# Patient Record
Sex: FEMALE | Race: WHITE
[De-identification: names, ages, dates, MRNs, and addresses within clinical notes are randomized per-mention and may not be internally consistent; named-entity substitution may affect disease eponyms.]

---

## 2021-08-19 ENCOUNTER — HOSPITAL ENCOUNTER (INPATIENT)
Dept: HOSPITAL 56 - MW.OBCHECK | Age: 26
LOS: 5 days | Discharge: HOME | DRG: 540 | End: 2021-08-24
Attending: OBSTETRICS & GYNECOLOGY | Admitting: OBSTETRICS & GYNECOLOGY
Payer: COMMERCIAL

## 2021-08-19 DIAGNOSIS — Z20.822: ICD-10-CM

## 2021-08-19 DIAGNOSIS — D64.9: ICD-10-CM

## 2021-08-19 DIAGNOSIS — Z90.49: ICD-10-CM

## 2021-08-19 DIAGNOSIS — Z3A.38: ICD-10-CM

## 2021-08-19 DIAGNOSIS — E61.1: ICD-10-CM

## 2021-08-19 DIAGNOSIS — Z88.1: ICD-10-CM

## 2021-08-19 LAB
BUN SERPL-MCNC: 8 MG/DL (ref 7–18)
CHLORIDE SERPL-SCNC: 103 MMOL/L (ref 98–107)
CO2 SERPL-SCNC: 23.1 MMOL/L (ref 21–32)
GLUCOSE SERPL-MCNC: 67 MG/DL (ref 74–106)
POTASSIUM SERPL-SCNC: 4.2 MMOL/L (ref 3.5–5.1)
SODIUM SERPL-SCNC: 137 MMOL/L (ref 136–145)

## 2021-08-19 PROCEDURE — U0002 COVID-19 LAB TEST NON-CDC: HCPCS

## 2021-08-20 RX ADMIN — ONDANSETRON PRN MG: 2 INJECTION, SOLUTION INTRAMUSCULAR; INTRAVENOUS at 16:37

## 2021-08-20 RX ADMIN — ONDANSETRON PRN MG: 2 INJECTION, SOLUTION INTRAMUSCULAR; INTRAVENOUS at 22:51

## 2021-08-20 NOTE — PCM.PREANE
Preanesthetic Assessment





- Anesthesia/Transfusion/Family Hx


Anesthesia History: Prior Anesthesia Without Reaction


Family History of Anesthesia Reaction: No


Transfusion History: No Prior Transfusion(s)





- Review of Systems


General: No Symptoms


Pulmonary: No Symptoms


Cardiovascular: No Symptoms


Gastrointestinal: No Symptoms


Neurological: No Symptoms


Other: Reports: None





- Physical Assessment


Height: 5 ft 3 in


Weight: 206 lb


ASA Class: 2


Mental Status: Alert & Oriented x3


Airway Class: Mallampati = 3


Dentition: Reports: Normal Dentition


ROM/Head Extension: Full


Lungs: Clear to Auscultation, Normal Respiratory Effort


Cardiovascular: Regular Rate, Regular Rhythm





- Lab


Values: 





                             Laboratory Last Values











WBC  10.13 K/uL (4.0-11.0)   08/19/21  16:40    


 


RBC  4.63 M/uL (4.30-5.90)   08/19/21  16:40    


 


Hgb  10.5 g/dL (12.0-16.0)  L  08/19/21  16:40    


 


Hct  34.5 % (36.0-46.0)  L  08/19/21  16:40    


 


MCV  74.5 fL (80.0-98.0)  L  08/19/21  16:40    


 


MCH  22.7 pg (27.0-32.0)  L  08/19/21  16:40    


 


MCHC  30.4 g/dL (31.0-37.0)  L  08/19/21  16:40    


 


RDW Std Deviation  55.2 fl (28.0-62.0)   08/19/21  16:40    


 


RDW Coeff of Zahra  20 % (11.0-15.0)  H  08/19/21  16:40    


 


Plt Count  336 K/uL (150-400)   08/19/21  16:40    


 


MPV  9.10 fL (7.40-12.00)   08/19/21  16:40    


 


Nucleated RBC %  0.0 /100WBC  08/19/21  16:40    


 


Nucleated RBCs #  0 K/uL  08/19/21  16:40    


 


Sodium  137 mmol/L (136-145)   08/19/21  16:40    


 


Potassium  4.2 mmol/L (3.5-5.1)   08/19/21  16:40    


 


Chloride  103 mmol/L ()   08/19/21  16:40    


 


Carbon Dioxide  23.1 mmol/L (21.0-32.0)   08/19/21  16:40    


 


BUN  8 mg/dL (7.0-18.0)   08/19/21  16:40    


 


Creatinine  0.6 mg/dL (0.6-1.0)   08/19/21  16:40    


 


Est Cr Clr Drug Dosing  TNP   08/19/21  16:40    


 


Estimated GFR (MDRD)  > 60.0 ml/min  08/19/21  16:40    


 


Glucose  67 mg/dL ()  L  08/19/21  16:40    


 


Uric Acid  2.9 mg/dL (2.6-7.2)   08/19/21  16:40    


 


Calcium  8.6 mg/dL (8.5-10.1)   08/19/21  16:40    


 


Total Bilirubin  0.6 mg/dL (0.2-1.0)   08/19/21  16:40    


 


AST  13 IU/L (15-37)  L  08/19/21  16:40    


 


ALT  19 IU/L (14-63)   08/19/21  16:40    


 


Alkaline Phosphatase  159 U/L ()  H  08/19/21  16:40    


 


Total Protein  6.8 g/dL (6.4-8.2)   08/19/21  16:40    


 


Albumin  2.9 g/dL (3.4-5.0)  L  08/19/21  16:40    


 


Globulin  3.9 g/dL (2.6-4.0)   08/19/21  16:40    


 


Albumin/Globulin Ratio  0.7  (0.9-1.6)  L  08/19/21  16:40    


 


SARS-CoV-2 RNA (GEORGE)  NEGATIVE  (NEGATIVE)   08/19/21  16:40    


 


Blood Type  O POSITIVE   08/19/21  16:40    


 


Antibody Screen  NEGATIVE   08/19/21  16:40    














- Allergies


Allergies/Adverse Reactions: 


                                    Allergies











Allergy/AdvReac Type Severity Reaction Status Date / Time


 


infliximab [From Remicade] Allergy Unknown Hives Verified 06/29/21 21:21














- Blood


Blood Available: Yes


Product(s) Available: PRBC





- Anesthesia Plan


Pre-Op Medication Ordered: None





- Acknowledgements


Anesthesia Type Planned: Epidural


Pt an Appropriate Candidate for the Planned Anesthesia: Yes


Alternatives and Risks of Anesthesia Discussed w Pt/Guardian: Yes


Pt/Guardian Understands and Agrees with Anesthesia Plan: Yes





PreAnesthesia Questionnaire


OB/GYN History: Reports: Pregnancy


Hematologic History: Reports: Anemia, Iron Deficiency


Dermatologic History: Reports: Psoriasis





- Past Surgical History


HEENT Surgical History: Reports: Adenoidectomy, Tonsillectomy


GI Surgical History: Reports: Appendectomy, Cholecystectomy, Other (See Below)


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 

intestinal rupture with gangrene





- SUBSTANCE USE


Tobacco Use Status *Q: Never Tobacco User


Tobacco Use Within Last Twelve Months: No


Second Hand Smoke Exposure: No


Recreational Drug Use History: No





- HOME MEDS


Home Medications: 


                                    Home Meds





Cholecalciferol (Vitamin D3) [Vitamin D3] 1,000 unit PO 08/19/21 [History]


Ferrous Sulfate, Dried [Iron] 160 mg PO DAILY 08/19/21 [History]


Pnv No.95/Ferrous Fum/Folic AC [Prenatal Caplet] 1 each PO DAILY 08/19/21 

[History]











- CURRENT (IN HOUSE) MEDS


Current Meds: 





                               Current Medications





Acetaminophen (Acetaminophen 500 Mg Tab)  1,000 mg PO Q6H PRN


   PRN Reason: Headache


   Last Admin: 08/19/21 22:45 Dose:  1,000 mg


   Documented by: 


Butorphanol Tartrate (Butorphanol 1 Mg/Ml Sdv)  1 mg IVPUSH Q1H PRN


   PRN Reason: Pain (severe 7-10)


Carboprost Tromethamine (Carboprost Tromethamine 250 Mcg/1 Ml Amp)  250 mcg IM 

ASDIRECTED PRN


   PRN Reason: Post Partum Hemorrhage


Oxytocin/Sodium Chloride (Oxytocin 30 Unit/500 Ml-Ns)  30 unit in 500 mls @ 999 

mls/hr IV TITRATE [facility]


Tranexamic Acid 1,000 mg/ (Sodium Chloride)  110 mls @ 660 mls/hr IV ONETIME PRN


   PRN Reason: Bleeding


Oxytocin/Sodium Chloride (Oxytocin 30 Unit/500 Ml-Ns)  30 unit in 500 mls @ 2 

mls/hr IV TITRATE [facility]; Protocol


   Last Titration: 08/20/21 07:59 Dose:  8 munits/min, 8 mls/hr


   Documented by: 


Lactated Ringer's (Ringers, Lactated)  1,000 mls @ 150 mls/hr IV ASDIRECTED ILE[facility]


   Last Admin: 08/20/21 09:35 Dose:  150 mls/hr


   Documented by: 


Lidocaine HCl (Lidocaine 1% 50 Ml Mdv)  50 ml INJECT ONETIME PRN


   PRN Reason: Laceration repair


Methylergonovine Maleate (Methylergonovine 0.2 Mg/1 Ml Amp)  0.2 mg IM 

ASDIRECTED PRN


   PRN Reason: Post Partum Hemorrhage


Misoprostol (Misoprostol 200 Mcg Tab)  200 mcg PO ONETIME PRN


   PRN Reason: Post Partum Hemorrhage


Misoprostol (Misoprostol 25 Mcg (1/4 Of 100 Mcg) Tab)  25 mcg VAG ONETIME PRN


   PRN Reason: Cervical Ripening


   Last Admin: 08/19/21 17:51 Dose:  25 mcg


   Documented by: 


Misoprostol (Misoprostol 25 Mcg (1/4 Of 100 Mcg) Tab)  25 mcg VAG Q4H PRN


   PRN Reason: Cervical Ripening


Ondansetron HCl (Ondansetron 4 Mg/2 Ml Sdv)  4 mg IVPUSH Q6H PRN


   PRN Reason: Nausea/Vomiting


Sodium Chloride (Sodium Chloride 0.9% 10 Ml Syringe)  10 ml FLUSH ASDIRECTED PRN


   PRN Reason: Keep Vein Open


Sodium Chloride (Sodium Chloride 0.9% 2.5 Ml Syringe)  2.5 ml FLUSH ASDIRECTED 

PRN


   PRN Reason: Keep Vein Open


Sodium Chloride (Sodium Chloride 0.9% 10 Ml Sdv)  10 ml IV ASDIRECTED PRN


   PRN Reason: IV Use


Sterile Water (Water For Irrigation,Sterile 1,000 Ml Container)  1,000 ml IRR 

ASDIRECTED PRN


   PRN Reason: delivery


Terbutaline Sulfate (Terbutaline 1 Mg/Ml Sdv)  0.25 mg SUBCUT ASDIRECTED PRN


   PRN Reason: Tacysystole





Discontinued Medications





Ropivacaine (Naropin 0.2%) Confirm Administered Dose 200 mls @ as directed 

.ROUTE .[employer]-MED ONE


   Stop: 08/20/21 09:36











- Pre-Procedure Checklist


Attending Provider Aware: Yes


Chart Reviewed: Yes


Consent Signed: Yes


Labs Reviewed: Yes


VS/FHR Reviewed: Yes


Patient Identification Confirmation Method: Reports: Verbal Patient


Pt an Appropriate Candidate for the Planned Anesthesia: Yes


Alternatives and Risks of Anesthesia Discussed w Pt/Guardian: Yes





- Procedure


Procedure Start Date: 08/20/21


Procedure Start Time: 09:39


Monitors in Place: Reports: Blood Pressure, Heart Rate, SPO2


Functional IV: Yes


Safety Measures: Reports: Patient Identified, Procedure Verified, Site Verified,

 Procedure Time Out


Patient Position: Reports: Sitting


Prep: Reports: Betadine x3, Sterile Drape


Local Anesthetic: Reports: Intradermal Wheal w Lidocaine 1%


Regional Placement Level: Reports: L3-4


Needle: Reports: 17 g Touhy


Approach: Reports: Midline


Technique: Reports: TRISTAN Plastic Syringe


Parasthesia: Reports: None


Fluid Obtained: Reports: None


Test Dose Time: 09:45


Test Dose Medication: Reports: Lidocaine 1.5% w Epinephrine 1:200,000


Test Dose Response: Reports: Negative


Loading Dose Time: 09:44


Loading Dose Medication: bupivicaine 0.25 10cc


Loading Dose Patient Position: sitting


Continuous Infusion Start Time: 09:50


Continuous Infusion Medication: ropivicaine 0.2%


Continuous Infusion Rate: 16


Continuous Infusion PCS Bolus Option: 4


Continuous Infusion Lockout Dose (cc/hr): 15


Patient Position Post Placement: Reports: Supline/RADHA


VS and FHR Monitored in Unit Post Placement: Yes


Procedure End Date: 08/20/21


Procedure End Time: 10:39

## 2021-08-21 RX ADMIN — KETOROLAC TROMETHAMINE SCH MG: 30 INJECTION, SOLUTION INTRAMUSCULAR at 23:48

## 2021-08-21 RX ADMIN — KETOROLAC TROMETHAMINE SCH MG: 30 INJECTION, SOLUTION INTRAMUSCULAR at 12:06

## 2021-08-21 RX ADMIN — KETOROLAC TROMETHAMINE SCH MG: 30 INJECTION, SOLUTION INTRAMUSCULAR at 17:59

## 2021-08-21 NOTE — PCM.PRNOTE
- Free Text/Narrative


Note: 


Patient was fully dilated to 12 midnight, Head was allowed head to descent . 


 I started to push with patient at 3am , Pushed till 4.30am. Head noted to be


 in OT position. Patient consented for vacuum ( verbal ) due to maternal fatigue

and


prolonged second stage. 


VE: FD / 1+ station


Bladder previously empted with claire. Kiwi cup placed at flexion point . 3 

attempts at vacuum extraction with pop off


At the point . I recommend  .


Patient consented for primary  .

## 2021-08-21 NOTE — PCM.OPNOTE
- General Post-Op/Procedure Note


Date of Surgery/Procedure: 21


Operative Procedure(s): Primary Lower transverse 


Findings: 


Abdominal incision- Midline vertical 


 Uterine incision: Lower transverse incision 


Filmy adhesions noted around the uterus and also around lower abdomen 


Live male  delivered 529am , apgar 1 ,9 , weight 3487g , nuchal cord 

around the neck X 2 


Uterus incision with extension to the vaginal , repaired in 2 layers


Bladder filled sterile milk prior to hysterotomy incision and after repair of 

the lower uterine segment: Intact bladder noted 


Pre Op Diagnosis: 24yo  @ 38w1d with Arrest of Dilatation.  Failed vacuum


Post-Op Diagnosis: same


Anesthesia Technique: Epidural


Primary Surgeon: Gus Osborn


Anesthesia Provider: Zachary Velasco


Pathology: 


Placenta 





Fluid Replacement, Intraop: 1,000


Output, Urine Amount: 150


EBL in mLs: 600


Complications: None


Condition: Good


Free Text/Narrative:: 


                                 Intake & Output











 21





 22:59 06:59 14:59


 


Output Total  1650 


 


Balance  -1650

## 2021-08-22 RX ADMIN — OXYCODONE HYDROCHLORIDE AND ACETAMINOPHEN PRN TAB: 5; 325 TABLET ORAL at 22:47

## 2021-08-22 RX ADMIN — OXYCODONE HYDROCHLORIDE AND ACETAMINOPHEN PRN TAB: 5; 325 TABLET ORAL at 15:32

## 2021-08-22 RX ADMIN — OXYCODONE HYDROCHLORIDE AND ACETAMINOPHEN PRN TAB: 5; 325 TABLET ORAL at 09:26

## 2021-08-22 RX ADMIN — KETOROLAC TROMETHAMINE SCH MG: 30 INJECTION, SOLUTION INTRAMUSCULAR at 05:24

## 2021-08-22 NOTE — PCM.PNPP
- General Info


Date of Service: 21


Subjective Update: 





26yo P1 s/p primary  for arrest of descent


Patient complain of heaviness of lower limbs and could only walk at about 2am 

last night with assistance


Normal lochia , well controlled pain 


U/O approximately 60mls per hour 


Functional Status: Reports: Pain Controlled, Tolerating Diet, Ambulating





- Review of Systems


General: Reports: No Symptoms


HEENT: Reports: No Symptoms


Pulmonary: Reports: No Symptoms


Cardiovascular: Reports: No Symptoms


Gastrointestinal: Reports: No Symptoms


Genitourinary: Reports: No Symptoms


Musculoskeletal: Reports: No Symptoms, Other (Weakness of the lower extermitiy )


Skin: Reports: No Symptoms


Neurological: Reports: No Symptoms, Difficulty Walking, Weakness


Psychiatric: Reports: No Symptoms





- General Info


Date of Service: 21





- Patient Data


Vital Signs - Most Recent: 


                                Last Vital Signs











Temp  36.0 C L  21 08:00


 


Pulse  109 H  21 08:00


 


Resp  18   21 08:00


 


BP  113/59 L  21 08:00


 


Pulse Ox  97   21 08:00











Weight - Most Recent: 93.44 kg


I&O - Last 24 Hours: 


                                 Intake & Output











 21





 22:59 06:59 14:59


 


Intake Total 224  


 


Output Total 650 1000 


 


Balance -426 -1000 











Lab Results - Last 24 Hours: 


                         Laboratory Results - last 24 hr











  21 Range/Units





  05:23 


 


Hgb  8.9 L  (12.0-16.0)  g/dL


 


Hct  29.1 L  (36.0-46.0)  %











Med Orders - Current: 


                               Current Medications





Acetaminophen (Acetaminophen 500 Mg Tab)  1,000 mg PO Q6H PRN


   PRN Reason: Headache


   Last Admin: 21 22:45 Dose:  1,000 mg


   Documented by: 


Bisacodyl (Bisacodyl 10 Mg Supp)  10 mg RECTAL ONETIME PRN


   PRN Reason: Constipation


Butorphanol Tartrate (Butorphanol 1 Mg/Ml Sdv)  1 mg IVPUSH Q1H PRN


   PRN Reason: Pain (severe 7-10)


Carboprost Tromethamine (Carboprost Tromethamine 250 Mcg/1 Ml Amp)  250 mcg IM 

ASDIRECTED PRN


   PRN Reason: Post Partum Hemorrhage


Diphenhydramine HCl (Diphenhydramine 50 Mg/Ml Sdv)  25 mg IVPUSH Q6H PRN


   PRN Reason: Itching or Nausea


Docusate Sodium (Docusate Sodium 100 Mg Cap)  100 mg PO BID [facility]


Emollient Ointment (Lanolin 100% Cream 7 Gm Tube)  0 gm TOP ASDIRECTED PRN


   PRN Reason: Sore Nipples


Ephedrine Sulfate (Ephedrine 50 Mg/Ml Sdv)  10 mg IVPUSH Q5M PRN


   PRN Reason: Hypotension


Tranexamic Acid 1,000 mg/ (Sodium Chloride)  110 mls @ 660 mls/hr IV ONETIME PRN


   PRN Reason: Bleeding


Lactated Ringer's (Ringers, Lactated)  1,000 mls @ 125 mls/hr IV ASDIRECTED I[facility]


   Last Admin: 21 03:06 Dose:  125 mls/hr


   Documented by: 


Oxytocin/Lactated Ringer's (Pitocin In Lr 30 Units/500 Ml)  30 unit in 500 mls @

2 mls/hr IV TITRATE [facility]; Protocol


Ibuprofen (Ibuprofen 800 Mg Tab)  800 mg PO Q8H PRN


   PRN Reason: mild pain or fever


Lidocaine HCl (Lidocaine 1% 50 Ml Mdv)  50 ml INJECT ONETIME PRN


   PRN Reason: Laceration repair


Methylergonovine Maleate (Methylergonovine 0.2 Mg/1 Ml Amp)  0.2 mg IM 

ASDIRECTED PRN


   PRN Reason: Post Partum Hemorrhage


Methylergonovine Maleate (Methylergonovine 0.2 Mg/1 Ml Amp)  0.2 mg IM ONETIME 

PRN


   PRN Reason: Excessive Vaginal Bleeding


Misoprostol (Misoprostol 200 Mcg Tab)  200 mcg PO ONETIME PRN


   PRN Reason: Post Partum Hemorrhage


Misoprostol (Misoprostol 25 Mcg (1/4 Of 100 Mcg) Tab)  25 mcg VAG ONETIME PRN


   PRN Reason: Cervical Ripening


   Last Admin: 21 17:51 Dose:  25 mcg


   Documented by: 


Misoprostol (Misoprostol 25 Mcg (1/4 Of 100 Mcg) Tab)  25 mcg VAG Q4H PRN


   PRN Reason: Cervical Ripening


Misoprostol (Misoprostol 200 Mcg Tab)  1,000 mcg RECTAL ONETIME PRN


   PRN Reason: excessive bleeding


Ondansetron HCl (Ondansetron 4 Mg/2 Ml Sdv)  4 mg IVPUSH Q4H PRN


   PRN Reason: Nausea/Vomiting


Oxycodone/Acetaminophen (Acetaminophen/Oxycodone 325-5 Mg Tab)  1 tab PO Q4H PRN


   PRN Reason: Pain (severe 7-10)


Oxycodone/Acetaminophen (Acetaminophen/Oxycodone 325-5 Mg Tab)  2 tab PO Q4H PRN


   PRN Reason: Pain (severe 7-10)


Oxytocin (Oxytocin 10 Units/1 Ml Sdv)  10 unit IM ASDIRECTED PRN


   PRN Reason: Excessive Vaginal Bleeding


Sodium Chloride (Sodium Chloride 0.9% 10 Ml Syringe)  10 ml FLUSH ASDIRECTED PRN


   PRN Reason: Keep Vein Open


Sodium Chloride (Sodium Chloride 0.9% 2.5 Ml Syringe)  2.5 ml FLUSH ASDIRECTED 

PRN


   PRN Reason: Keep Vein Open


Sodium Chloride (Sodium Chloride 0.9% 10 Ml Sdv)  10 ml IV ASDIRECTED PRN


   PRN Reason: IV Use


Sterile Water (Water For Irrigation,Sterile 1,000 Ml Container)  1,000 ml IRR 

ASDIRECTED PRN


   PRN Reason: delivery


Terbutaline Sulfate (Terbutaline 1 Mg/Ml Sdv)  0.25 mg SUBCUT ASDIRECTED PRN


   PRN Reason: Tacysystole





Discontinued Medications





Bupivacaine HCl (Bupivacaine 0.5% 30 Ml Sdv) Confirm Administered Dose 30 ml 

.ROUTE .STK-MED ONE


   Stop: 21 04:19


Cefazolin Sodium (Cefazolin 1 Gm Vial) Confirm Administered Dose 2 gm .ROUTE 

.STK-MED ONE


   Stop: 21 04:57


Fentanyl (Fentanyl 100 Mcg/2 Ml Sdv) Confirm Administered Dose 100 mcg .ROUTE 

.STK-MED ONE


   Stop: 21 04:23


Oxytocin/Sodium Chloride (Oxytocin 30 Unit/500 Ml-Ns)  30 unit in 500 mls @ 999 

mls/hr IV TITRATE [facility]


Oxytocin/Sodium Chloride (Oxytocin 30 Unit/500 Ml-Ns)  30 unit in 500 mls @ 2 

mls/hr IV TITRATE [facility]; Protocol


   Last Titration: 21 03:04 Dose:  11 munits/min, 11 mls/hr


   Documented by: 


Lactated Ringer's (Ringers, Lactated)  1,000 mls @ 150 mls/hr IV ASDIRECTED [facility]


   Last Infusion: 21 00:54 Dose:  Infused


   Documented by: 


Ropivacaine (Naropin 0.2%) Confirm Administered Dose 200 mls @ as directed 

.ROUTE .STK-MED ONE


   Stop: 21 09:36


Ropivacaine (Naropin 0.2%) Confirm Administered Dose 200 mls @ as directed 

.ROUTE .ST-MED ONE


   Stop: 21 22:01


Sodium Chloride (Normal Saline) Confirm Administered Dose 20 mls @ as directed 

.ROUTE .ST-MED ONE


   Stop: 21 04:59


Sodium Chloride (Normal Saline) Confirm Administered Dose 20 mls @ as directed 

.ROUTE .ST-[facility] ONE


   Stop: 21 05:02


Ketorolac Tromethamine (Ketorolac 30 Mg/Ml Sdv) Confirm Administered Dose 30 mg 

.ROUTE .ST-MED ONE


   Stop: 21 04:48


Ketorolac Tromethamine (Ketorolac 30 Mg/Ml Sdv)  30 mg IVPUSH Q6H [facility]


   Stop: 21 05:01


   Last Admin: 21 05:24 Dose:  30 mg


   Documented by: 


Lidocaine (Lidocaine 2% 5 Ml Sdv) Confirm Administered Dose 95 ml .ROUTE .[facility]-

MED ONE


   Stop: 21 04:00


Measles/Mumps/Rubella Vaccine Live (Measles, Mumps & Rubella Vaccine 0.5 Ml Sdv)

 0.5 ml SUBCUT .ONCE ONE


   Stop: 21 07:32


Morphine Sulfate (Morphine Pf 10 Mg/10 Ml Sdv) Confirm Administered Dose 10 mg 

.ROUTE .ST-MED ONE


   Stop: 21 06:05


Ondansetron HCl (Ondansetron 4 Mg/2 Ml Sdv)  4 mg IVPUSH Q6H PRN


   PRN Reason: Nausea/Vomiting


   Last Admin: 21 22:51 Dose:  4 mg


   Documented by: 


Ondansetron HCl (Ondansetron 4 Mg/2 Ml Sdv) Confirm Administered Dose 4 mg 

.ROUTE .ST-MED ONE


   Stop: 21 04:48


Oxytocin (Oxytocin 10 Units/1 Ml Sdv) Confirm Administered Dose 30 unit .ROUTE 

.ST-MED ONE


   Stop: 21 04:48











- Infant Interaction


Support Person: Significant Other





- Postpartum Recovery Exam


Fundal Tone: Firm


Fundal Level: At Umbilicus


Fundal Placement: Midline


Lochia Amount: Scant


Lochia Color: Rubra/Red


Perineum Description: Edematous


Episiotomy/Laceration: Approximated


Bladder Status: Indwelling Catheter in Place


Urinary Elimination: Indwelling Catheter





- Exam


General: Alert


Neck: Supple


Lungs: Clear to Auscultation


Cardiovascular: Regular Rate, Regular Rhythm


GI/Abdominal Exam: Normal Bowel Sounds


Extremities: Normal Inspection


Wound/Incisions: Dressing Dry and Intact


Neurological: Other (RLL- 4 , LL - 3 )


Psy/Mental Status: Alert





- Problem List & Annotations


(1)  delivery delivered


SNOMED Code(s): 965690263


   Code(s): O82 - ENCOUNTER FOR  DELIVERY WITHOUT INDICATION   Status: 

Acute   Current Visit: Yes   





- Problem List Review


Problem List Initiated/Reviewed/Updated: Yes





- My Orders


Last 24 Hours: 


My Active Orders





21 09:00


Docusate Sodium [Colace]   100 mg PO BID 





21 11:00


Ibuprofen [Motrin]   800 mg PO Q8H PRN 














- Assessment


Assessment:: 


26yo P1 s/p primary  for Arrest of Descent ( Failed vacuum)


 Mild anemia - asymptomatic


  with hyperbilirubinemia and Subgaleal hematoma 








- Plan


Plan:: 


 Routine postpartum care


  Pain control as needed


  Fletcher out - follow void


  Incentive spirometry


  May discharge today if  transferred

## 2021-08-22 NOTE — OR
SURGEON:

DEMETRIO TOLEDO

 

DATE OF PROCEDURE:2021

 

PREOPERATIVE DIAGNOSES:

A 25-year-old G1, P0, at 38 weeks and 1 day for induction of labor secondary to

Gestational hypertension, Arrest of descent, Failed vacuum attempt.

 

POSTOPERATIVE DIAGNOSES:

A 25-year-old G1, P0, at 38 weeks and 1 day for induction of labor secondary to

Gestational hypertension, Arrest of descent, Failed vacuum attempt.

 

PROCEDURE:

Primary lower segment transverse  section with midline abdominal

vertical incision.



ANAESTHESIA: Epidural

 

EBL:

600.

 

IV FLUIDS:

1000.

 

URINE OUTPUT:

150.



COMPLICATIONS: None



NOTES AND FINDINGS:

A live male  delivered at 5:29 a.m. Apgar score of  1 and 9.  Weight is

3487 g.  O positive, rubella nonimmune.

 

BRIEF HISTORY:

The patient is G1, P0, at 38 weeks and 1 day, who was admitted for induction of

labor secondary to gestational hypertension.  PIH labs were done, which was

normal.  The patient received Cytotec.  With Cytotec, patient had some

contractions, so she then had CRB placed  with Pitocin.  After some hours ,the 
CRB came out.  

She was about 6 to 7 cm dilated.  

The patient had a spontaneous rupture of membrane. She made progress to fully 
dilated.

The patient being fully dilated, she was encouraged to push.  She pushed for

about 3 hours.  The head was in the OT position, +2 station.  Attempt was made

to vacuum patient after consent was taken, however, there were 3 pop-offs. There

were also caput noted prior to application of the vacuum.  As a result, 

consent was obtained for  section as a result of arrest of descent and

failed vacuum attempt. She was explained the risk benefit and alternatives.

 

DESCRIPTION OF PROCEDURE:

The patient was taken to the operating room where epidural anesthesia was topped

off.  She was prepared and draped in the lithotomy position in a froglike

position.  Midline vertical incision was done on the site of the previous scar

and the incision was carried down to the fascia.  There was some adhesion noted

between the subcutaneous and fascia and the peritoneum, which was lysed

carefully.  After the fascia was entered, the rectus muscle was  in

the midline and the abdomen was entered in bluntly without any difficulty.  In

the lower uterine segment, there was noted to be some adhesions of the bladder 
to 

the lower uterine segment. Above the uterus filmy adhesion was noted around 
uterus to the peritoneum. 

A bladder blade was placed to expose the lower uterine segment.  Prior to this, 
the bladder was backfilled with sterile milk.  No

laceration was noted on the bladder.  A transverse lower uterine segment was 
made with the 

scalpel and extended manually upper and outward. An assistant helped to elevate 
the

head simultaneous as i reached for the fetal head to elevate to the incision. 

The head was successfully  elevated to the level of the incision.

There were 2 nuchal cords, the nuchal cord was reduced , with fundal pressure 
the infant was delivered.  The cord was clamped and cut.

Infant was handed over to the waiting pediatric team . Cord blood gases was 
obtained. The hysterotomy incision was surveyed.

There was an extension of the uterine incison longitudinally toward the lower 
segment and vagina. With the extension  well delineated with 2 allis 

clamp, the defect was sutured with 0 Vicryl in a continous interlocking manner .
 After this was sutured, then the

lower transverse incision was then also sutured with 0 Vicryl in a interlocking 
manner .  An imbricating

stitch was also done.  The incision was inspected, noted to be hemostatic.

After suturing of the hysterotomy, sterile milk was also backfilled to the

bladder.  The bladder was noted to be intact.  The incision was noted to be

hemostatic.  The peritoneum was then closed with 2-0 Vicryl.  The fascia was

closed with 0 looped PDS.  The subcutaneous fascia was closed with plain gut,

and the skin was closed with 3-0 Vicryl on a Sony needle.  All instrument and

pad counts were correct x2.

 

 

JOSÉ ANTONIO / PATRICK

DD:  2021 21:27:04

DT:  2021 22:34:06

Job #:  [id number]

RAYNA

## 2021-08-23 RX ADMIN — OXYCODONE HYDROCHLORIDE AND ACETAMINOPHEN PRN TAB: 5; 325 TABLET ORAL at 22:45

## 2021-08-23 RX ADMIN — OXYCODONE HYDROCHLORIDE AND ACETAMINOPHEN PRN TAB: 5; 325 TABLET ORAL at 09:22

## 2021-08-23 RX ADMIN — OXYCODONE HYDROCHLORIDE AND ACETAMINOPHEN PRN TAB: 5; 325 TABLET ORAL at 15:43

## 2021-08-23 NOTE — PCM.PNPP
- General Info


Date of Service: 21


Functional Status: Reports: Pain Controlled, Tolerating Diet, Ambulating, 

Urinating





- Review of Systems


General: Reports: No Symptoms


HEENT: Reports: No Symptoms


Pulmonary: Reports: No Symptoms


Cardiovascular: Reports: No Symptoms


Gastrointestinal: Reports: No Symptoms


Genitourinary: Reports: No Symptoms


Musculoskeletal: Reports: No Symptoms


Skin: Reports: No Symptoms


Neurological: Reports: No Symptoms


Psychiatric: Reports: No Symptoms





- Patient Data


Vital Signs - Most Recent: 


                                Last Vital Signs











Temp  37.1 C   21 08:00


 


Pulse  111 H  21 08:00


 


Resp  20   21 08:00


 


BP  121/71   21 08:00


 


Pulse Ox  95   21 08:00











Weight - Most Recent: 206 lb


Med Orders - Current: 


                               Current Medications





Acetaminophen (Acetaminophen 500 Mg Tab)  1,000 mg PO Q6H PRN


   PRN Reason: Headache


   Last Admin: 21 22:45 Dose:  1,000 mg


   Documented by: 


Bisacodyl (Bisacodyl 10 Mg Supp)  10 mg RECTAL ONETIME PRN


   PRN Reason: Constipation


Butorphanol Tartrate (Butorphanol 1 Mg/Ml Sdv)  1 mg IVPUSH Q1H PRN


   PRN Reason: Pain (severe 7-10)


Carboprost Tromethamine (Carboprost Tromethamine 250 Mcg/1 Ml Amp)  250 mcg IM 

ASDIRECTED PRN


   PRN Reason: Post Partum Hemorrhage


Diphenhydramine HCl (Diphenhydramine 50 Mg/Ml Sdv)  25 mg IVPUSH Q6H PRN


   PRN Reason: Itching or Nausea


Docusate Sodium (Docusate Sodium 100 Mg Cap)  100 mg PO BID ILEANA


   Last Admin: 21 08:16 Dose:  100 mg


   Documented by: 


Emollient Ointment (Lanolin 100% Cream 7 Gm Tube)  0 gm TOP ASDIRECTED PRN


   PRN Reason: Sore Nipples


   Last Admin: 21 09:47 Dose:  1 tube


   Documented by: 


Ephedrine Sulfate (Ephedrine 50 Mg/Ml Sdv)  10 mg IVPUSH Q5M PRN


   PRN Reason: Hypotension


Tranexamic Acid 1,000 mg/ (Sodium Chloride)  110 mls @ 660 mls/hr IV ONETIME PRN


   PRN Reason: Bleeding


Lactated Ringer's (Ringers, Lactated)  1,000 mls @ 125 mls/hr IV ASDIRECTED [facility]


   Last Admin: 21 03:06 Dose:  125 mls/hr


   Documented by: 


Oxytocin/Lactated Ringer's (Pitocin In Lr 30 Units/500 Ml)  30 unit in 500 mls @

2 mls/hr IV TITRATE I[facility]; Protocol


Ibuprofen (Ibuprofen 800 Mg Tab)  800 mg PO Q8H PRN


   PRN Reason: mild pain or fever


   Last Admin: 21 08:16 Dose:  800 mg


   Documented by: 


Lidocaine HCl (Lidocaine 1% 50 Ml Mdv)  50 ml INJECT ONETIME PRN


   PRN Reason: Laceration repair


Methylergonovine Maleate (Methylergonovine 0.2 Mg/1 Ml Amp)  0.2 mg IM 

ASDIRECTED PRN


   PRN Reason: Post Partum Hemorrhage


Methylergonovine Maleate (Methylergonovine 0.2 Mg/1 Ml Amp)  0.2 mg IM ONETIME 

PRN


   PRN Reason: Excessive Vaginal Bleeding


Misoprostol (Misoprostol 200 Mcg Tab)  200 mcg PO ONETIME PRN


   PRN Reason: Post Partum Hemorrhage


Misoprostol (Misoprostol 25 Mcg (1/4 Of 100 Mcg) Tab)  25 mcg VAG ONETIME PRN


   PRN Reason: Cervical Ripening


   Last Admin: 21 17:51 Dose:  25 mcg


   Documented by: 


Misoprostol (Misoprostol 25 Mcg (1/4 Of 100 Mcg) Tab)  25 mcg VAG Q4H PRN


   PRN Reason: Cervical Ripening


Misoprostol (Misoprostol 200 Mcg Tab)  1,000 mcg RECTAL ONETIME PRN


   PRN Reason: excessive bleeding


Ondansetron HCl (Ondansetron 4 Mg/2 Ml Sdv)  4 mg IVPUSH Q4H PRN


   PRN Reason: Nausea/Vomiting


Oxycodone/Acetaminophen (Acetaminophen/Oxycodone 325-5 Mg Tab)  1 tab PO Q4H PRN


   PRN Reason: Pain (severe 7-10)


   Last Admin: 21 15:32 Dose:  1 tab


   Documented by: 


Oxycodone/Acetaminophen (Acetaminophen/Oxycodone 325-5 Mg Tab)  2 tab PO Q4H PRN


   PRN Reason: Pain (severe 7-10)


   Last Admin: 21 22:47 Dose:  2 tab


   Documented by: 


Oxytocin (Oxytocin 10 Units/1 Ml Sdv)  10 unit IM ASDIRECTED PRN


   PRN Reason: Excessive Vaginal Bleeding


Sodium Chloride (Sodium Chloride 0.9% 10 Ml Syringe)  10 ml FLUSH ASDIRECTED PRN


   PRN Reason: Keep Vein Open


Sodium Chloride (Sodium Chloride 0.9% 2.5 Ml Syringe)  2.5 ml FLUSH ASDIRECTED 

PRN


   PRN Reason: Keep Vein Open


Sodium Chloride (Sodium Chloride 0.9% 10 Ml Sdv)  10 ml IV ASDIRECTED PRN


   PRN Reason: IV Use


Sterile Water (Water For Irrigation,Sterile 1,000 Ml Container)  1,000 ml IRR 

ASDIRECTED PRN


   PRN Reason: delivery


Terbutaline Sulfate (Terbutaline 1 Mg/Ml Sdv)  0.25 mg SUBCUT ASDIRECTED PRN


   PRN Reason: Tacysystole





Discontinued Medications





Bupivacaine HCl (Bupivacaine 0.5% 30 Ml Sdv) Confirm Administered Dose 30 ml 

.ROUTE .STK-MED ONE


   Stop: 21 04:19


Cefazolin Sodium (Cefazolin 1 Gm Vial) Confirm Administered Dose 2 gm .ROUTE 

.STK-MED ONE


   Stop: 21 04:57


Fentanyl (Fentanyl 100 Mcg/2 Ml Sdv) Confirm Administered Dose 100 mcg .ROUTE 

.STK-MED ONE


   Stop: 21 04:23


Oxytocin/Sodium Chloride (Oxytocin 30 Unit/500 Ml-Ns)  30 unit in 500 mls @ 999 

mls/hr IV TITRATE ILEANA


Oxytocin/Sodium Chloride (Oxytocin 30 Unit/500 Ml-Ns)  30 unit in 500 mls @ 2 

mls/hr IV TITRATE ILEANA; Protocol


   Last Titration: 21 03:04 Dose:  11 munits/min, 11 mls/hr


   Documented by: 


Lactated Ringer's (Ringers, Lactated)  1,000 mls @ 150 mls/hr IV ASDIRECTED I[facility]


   Last Infusion: 21 00:54 Dose:  Infused


   Documented by: 


Ropivacaine (Naropin 0.2%) Confirm Administered Dose 200 mls @ as directed 

.ROUTE .STK-MED ONE


   Stop: 21 09:36


Ropivacaine (Naropin 0.2%) Confirm Administered Dose 200 mls @ as directed 

.ROUTE .STK-MED ONE


   Stop: 21 22:01


Sodium Chloride (Normal Saline) Confirm Administered Dose 20 mls @ as directed 

.ROUTE .STK-MED ONE


   Stop: 21 04:59


Sodium Chloride (Normal Saline) Confirm Administered Dose 20 mls @ as directed 

.ROUTE .STK-MED ONE


   Stop: 21 05:02


Ketorolac Tromethamine (Ketorolac 30 Mg/Ml Sdv) Confirm Administered Dose 30 mg 

.ROUTE .STK-MED ONE


   Stop: 21 04:48


Ketorolac Tromethamine (Ketorolac 30 Mg/Ml Sdv)  30 mg IVPUSH Q6H I[facility]ANA


   Stop: 21 05:01


   Last Admin: 21 05:24 Dose:  30 mg


   Documented by: 


Lidocaine (Lidocaine 2% 5 Ml Sdv) Confirm Administered Dose 95 ml .ROUTE .STK-

MED ONE


   Stop: 21 04:00


Measles/Mumps/Rubella Vaccine Live (Measles, Mumps & Rubella Vaccine 0.5 Ml Sdv)

 0.5 ml SUBCUT .ONCE ONE


   Stop: 21 07:32


Morphine Sulfate (Morphine Pf 10 Mg/10 Ml Sdv) Confirm Administered Dose 10 mg 

.ROUTE .STK-MED ONE


   Stop: 21 06:05


Ondansetron HCl (Ondansetron 4 Mg/2 Ml Sdv)  4 mg IVPUSH Q6H PRN


   PRN Reason: Nausea/Vomiting


   Last Admin: 21 22:51 Dose:  4 mg


   Documented by: 


Ondansetron HCl (Ondansetron 4 Mg/2 Ml Sdv) Confirm Administered Dose 4 mg 

.ROUTE .STK-MED ONE


   Stop: 21 04:48


Oxytocin (Oxytocin 10 Units/1 Ml Sdv) Confirm Administered Dose 30 unit .ROUTE 

.STK-MED ONE


   Stop: 21 04:48











- Infant Interaction


Infant Disposition, Postpartum: [place] at Bedside


Infant Interaction: Holding Infant


Infant Feeding: Attempted Breastfeeding; Nursed Fair/Poor


Support Person: Significant Other





- Postpartum Recovery Exam


Fundal Tone: Firm


Fundal Level: 2 Fingerbreadths Below Umbilicus


Fundal Placement: Midline


Lochia Amount: Scant


Lochia Color: Rubra/Red


Episiotomy/Laceration: None


Bladder Status: Voiding





- Exam


General: Alert, Oriented, Cooperative, No Acute Distress


HEENT: Pupils Equal


Neck: Supple, Trachea Midline, No JVD


Lungs: Normal Respiratory Effort


Cardiovascular: Regular Rate, Regular Rhythm, No Murmurs


GI/Abdominal Exam: Normal Bowel Sounds, Soft, Non-Tender, No Distention


Extremities: Normal Inspection, Normal Range of Motion, Non-Tender, No Pedal 

Edema


Skin: Warm, Dry, Intact


Wound/Incisions: Healing Well


Neurological: No New Focal Deficit


Psy/Mental Status: Alert, Normal Affect, Normal Mood





- Problem List Review


Problem List Initiated/Reviewed/Updated: Yes





- Assessment


Assessment:: 


24yo  POD2 s/p primary  for Arrest of Descent ( Failed vacuum)


 Mild anemia - asymptomatic


  with hyperbilirubinemia and Subgaleal hematoma 








- Plan


Plan:: 


 Routine postpartum care


  Pain control as needed


  Voiding normally


  Incentive spirometry


  Incision healing well


  Plan for discharge home tomorrow

## 2021-08-24 RX ADMIN — OXYCODONE HYDROCHLORIDE AND ACETAMINOPHEN PRN TAB: 5; 325 TABLET ORAL at 12:27

## 2021-08-24 RX ADMIN — OXYCODONE HYDROCHLORIDE AND ACETAMINOPHEN PRN TAB: 5; 325 TABLET ORAL at 05:33

## 2021-08-24 RX ADMIN — OXYCODONE HYDROCHLORIDE AND ACETAMINOPHEN PRN TAB: 5; 325 TABLET ORAL at 18:20

## 2021-08-24 NOTE — PCM.PNPP
- General Info


Date of Service: 21


Functional Status: Reports: Pain Controlled, Tolerating Diet, Ambulating, 

Urinating





- Review of Systems


General: Reports: No Symptoms


HEENT: Reports: No Symptoms


Pulmonary: Reports: No Symptoms


Cardiovascular: Reports: No Symptoms


Gastrointestinal: Reports: No Symptoms


Genitourinary: Reports: No Symptoms


Musculoskeletal: Reports: No Symptoms


Skin: Reports: No Symptoms


Neurological: Reports: No Symptoms


Psychiatric: Reports: No Symptoms





- Patient Data


Vital Signs - Most Recent: 


                                Last Vital Signs











Temp  36.7 C   21 07:40


 


Pulse  88   21 07:40


 


Resp  16   21 07:40


 


BP  124/74   21 07:40


 


Pulse Ox  100   21 07:40











Weight - Most Recent: 206 lb


Lab Results - Last 24 Hours: 


                         Laboratory Results - last 24 hr











  21 Range/Units





  16:40 


 


RPR  Non-Reac  (Non-Reac)  











Med Orders - Current: 


                               Current Medications





Acetaminophen (Acetaminophen 500 Mg Tab)  1,000 mg PO Q6H PRN


   PRN Reason: Headache


   Last Admin: 21 22:45 Dose:  1,000 mg


   Documented by: 


Bisacodyl (Bisacodyl 10 Mg Supp)  10 mg RECTAL ONETIME PRN


   PRN Reason: Constipation


Butorphanol Tartrate (Butorphanol 1 Mg/Ml Sdv)  1 mg IVPUSH Q1H PRN


   PRN Reason: Pain (severe 7-10)


Carboprost Tromethamine (Carboprost Tromethamine 250 Mcg/1 Ml Amp)  250 mcg IM 

ASDIRECTED PRN


   PRN Reason: Post Partum Hemorrhage


Diphenhydramine HCl (Diphenhydramine 50 Mg/Ml Sdv)  25 mg IVPUSH Q6H PRN


   PRN Reason: Itching or Nausea


Docusate Sodium (Docusate Sodium 100 Mg Cap)  100 mg PO BID ILEANA


   Last Admin: 21 09:57 Dose:  100 mg


   Documented by: 


Emollient Ointment (Lanolin 100% Cream 7 Gm Tube)  0 gm TOP ASDIRECTED PRN


   PRN Reason: Sore Nipples


   Last Admin: 21 09:47 Dose:  1 tube


   Documented by: 


Ephedrine Sulfate (Ephedrine 50 Mg/Ml Sdv)  10 mg IVPUSH Q5M PRN


   PRN Reason: Hypotension


Tranexamic Acid 1,000 mg/ (Sodium Chloride)  110 mls @ 660 mls/hr IV ONETIME PRN


   PRN Reason: Bleeding


Lactated Ringer's (Ringers, Lactated)  1,000 mls @ 125 mls/hr IV ASDIRECTED [facility]


   Last Admin: 21 03:06 Dose:  125 mls/hr


   Documented by: 


Oxytocin/Lactated Ringer's (Pitocin In Lr 30 Units/500 Ml)  30 unit in 500 mls @

2 mls/hr IV TITRATE [facility]; Protocol


Ibuprofen (Ibuprofen 800 Mg Tab)  800 mg PO Q8H PRN


   PRN Reason: mild pain or fever


   Last Admin: 21 05:34 Dose:  800 mg


   Documented by: 


Lidocaine HCl (Lidocaine 1% 50 Ml Mdv)  50 ml INJECT ONETIME PRN


   PRN Reason: Laceration repair


Methylergonovine Maleate (Methylergonovine 0.2 Mg/1 Ml Amp)  0.2 mg IM 

ASDIRECTED PRN


   PRN Reason: Post Partum Hemorrhage


Methylergonovine Maleate (Methylergonovine 0.2 Mg/1 Ml Amp)  0.2 mg IM ONETIME 

PRN


   PRN Reason: Excessive Vaginal Bleeding


Misoprostol (Misoprostol 200 Mcg Tab)  200 mcg PO ONETIME PRN


   PRN Reason: Post Partum Hemorrhage


Misoprostol (Misoprostol 25 Mcg (1/4 Of 100 Mcg) Tab)  25 mcg VAG ONETIME PRN


   PRN Reason: Cervical Ripening


   Last Admin: 21 17:51 Dose:  25 mcg


   Documented by: 


Misoprostol (Misoprostol 25 Mcg (1/4 Of 100 Mcg) Tab)  25 mcg VAG Q4H PRN


   PRN Reason: Cervical Ripening


Misoprostol (Misoprostol 200 Mcg Tab)  1,000 mcg RECTAL ONETIME PRN


   PRN Reason: excessive bleeding


Ondansetron HCl (Ondansetron 4 Mg/2 Ml Sdv)  4 mg IVPUSH Q4H PRN


   PRN Reason: Nausea/Vomiting


Oxycodone/Acetaminophen (Acetaminophen/Oxycodone 325-5 Mg Tab)  1 tab PO Q4H PRN


   PRN Reason: Pain (severe 7-10)


   Last Admin: 21 05:33 Dose:  1 tab


   Documented by: 


Oxycodone/Acetaminophen (Acetaminophen/Oxycodone 325-5 Mg Tab)  2 tab PO Q4H PRN


   PRN Reason: Pain (severe 7-10)


   Last Admin: 21 22:45 Dose:  2 tab


   Documented by: 


Oxytocin (Oxytocin 10 Units/1 Ml Sdv)  10 unit IM ASDIRECTED PRN


   PRN Reason: Excessive Vaginal Bleeding


Sodium Chloride (Sodium Chloride 0.9% 10 Ml Syringe)  10 ml FLUSH ASDIRECTED PRN


   PRN Reason: Keep Vein Open


Sodium Chloride (Sodium Chloride 0.9% 2.5 Ml Syringe)  2.5 ml FLUSH ASDIRECTED 

PRN


   PRN Reason: Keep Vein Open


Sodium Chloride (Sodium Chloride 0.9% 10 Ml Sdv)  10 ml IV ASDIRECTED PRN


   PRN Reason: IV Use


Sterile Water (Water For Irrigation,Sterile 1,000 Ml Container)  1,000 ml IRR 

ASDIRECTED PRN


   PRN Reason: delivery


Terbutaline Sulfate (Terbutaline 1 Mg/Ml Sdv)  0.25 mg SUBCUT ASDIRECTED PRN


   PRN Reason: Tacysystole





Discontinued Medications





Bupivacaine HCl (Bupivacaine 0.5% 30 Ml Sdv) Confirm Administered Dose 30 ml 

.ROUTE .STK-MED ONE


   Stop: 21 04:19


Cefazolin Sodium (Cefazolin 1 Gm Vial) Confirm Administered Dose 2 gm .ROUTE 

.STK-MED ONE


   Stop: 21 04:57


Fentanyl (Fentanyl 100 Mcg/2 Ml Sdv) Confirm Administered Dose 100 mcg .ROUTE 

.STK-MED ONE


   Stop: 21 04:23


Oxytocin/Sodium Chloride (Oxytocin 30 Unit/500 Ml-Ns)  30 unit in 500 mls @ 999 

mls/hr IV TITRATE ILEANA


Oxytocin/Sodium Chloride (Oxytocin 30 Unit/500 Ml-Ns)  30 unit in 500 mls @ 2 

mls/hr IV TITRATE ILEANA; Protocol


   Last Titration: 21 03:04 Dose:  11 munits/min, 11 mls/hr


   Documented by: 


Lactated Ringer's (Ringers, Lactated)  1,000 mls @ 150 mls/hr IV ASDIRECTED ILEANA


   Last Infusion: 21 00:54 Dose:  Infused


   Documented by: 


Ropivacaine (Naropin 0.2%) Confirm Administered Dose 200 mls @ as directed 

.ROUTE .STK-MED ONE


   Stop: 21 09:36


Ropivacaine (Naropin 0.2%) Confirm Administered Dose 200 mls @ as directed 

.ROUTE .STK-MED ONE


   Stop: 21 22:01


Sodium Chloride (Normal Saline) Confirm Administered Dose 20 mls @ as directed 

.ROUTE .STK-MED ONE


   Stop: 21 04:59


Sodium Chloride (Normal Saline) Confirm Administered Dose 20 mls @ as directed 

.ROUTE .STK-MED ONE


   Stop: 21 05:02


Ketorolac Tromethamine (Ketorolac 30 Mg/Ml Sdv) Confirm Administered Dose 30 mg 

.ROUTE .STK-MED ONE


   Stop: 21 04:48


Ketorolac Tromethamine (Ketorolac 30 Mg/Ml Sdv)  30 mg IVPUSH Q6H I[facility]


   Stop: 21 05:01


   Last Admin: 21 05:24 Dose:  30 mg


   Documented by: 


Lidocaine (Lidocaine 2% 5 Ml Sdv) Confirm Administered Dose 95 ml .ROUTE .STK-

MED ONE


   Stop: 21 04:00


Measles/Mumps/Rubella Vaccine Live (Measles, Mumps & Rubella Vaccine 0.5 Ml Sdv)

 0.5 ml SUBCUT .ONCE ONE


   Stop: 21 07:32


Morphine Sulfate (Morphine Pf 10 Mg/10 Ml Sdv) Confirm Administered Dose 10 mg 

.ROUTE .STK-MED ONE


   Stop: 21 06:05


Ondansetron HCl (Ondansetron 4 Mg/2 Ml Sdv)  4 mg IVPUSH Q6H PRN


   PRN Reason: Nausea/Vomiting


   Last Admin: 21 22:51 Dose:  4 mg


   Documented by: 


Ondansetron HCl (Ondansetron 4 Mg/2 Ml Sdv) Confirm Administered Dose 4 mg 

.ROUTE .STK-MED ONE


   Stop: 21 04:48


Oxytocin (Oxytocin 10 Units/1 Ml Sdv) Confirm Administered Dose 30 unit .ROUTE 

.STK-MED ONE


   Stop: 21 04:48











- Infant Interaction


Infant Disposition, Postpartum:  at Bedside


Infant Interaction: Holding Infant


Infant Feeding: Attempted Breastfeeding; Nursed Fair/Poor


Support Person: Significant Other





- Postpartum Recovery Exam


Fundal Tone: Firm


Fundal Level: 1 Fingerbreadths Below Umbilicus


Fundal Placement: Midline


Lochia Amount: Scant


Lochia Color: Rubra/Red


Perineum Description: Intact, Minimal Bruising/Swelling


Episiotomy/Laceration: Approximated


Bladder Status: Voiding


Urinary Elimination: Indwelling Catheter





- Exam


General: Alert, Oriented, Cooperative, No Acute Distress


HEENT: Pupils Equal, Pupils Reactive


Neck: Supple, Trachea Midline, No JVD


Lungs: Normal Respiratory Effort


GI/Abdominal Exam: Soft, Non-Tender, No Distention


Extremities: Normal Inspection, Normal Range of Motion, Non-Tender, No Pedal 

Edema


Skin: Warm, Dry, Intact


Wound/Incisions: Healing Well


Neurological: No New Focal Deficit


Psy/Mental Status: Alert, Normal Affect, Normal Mood





- Problem List Review


Problem List Initiated/Reviewed/Updated: Yes





- My Orders


Last 24 Hours: 


My Active Orders





21 10:26


Ready for Discharge [RC] PER UNIT ROUTINE 














- Assessment


Assessment:: 


24yo  POD3 s/p primary  for Arrest of Descent ( Failed vacuum)


 Mild anemia - asymptomatic


  with hyperbilirubinemia and Subgaleal hematoma 








- Plan


Plan:: 


 Routine postpartum care


  Pain control as needed


  Voiding normally


  Incentive spirometry


  Incision healing well


  Plan for discharge home today, reviewed postop care instructions. Will have 

incision and BP check in 1 week

## 2021-08-27 NOTE — PCM.POSTAN
POST ANESTHESIA ASSESSMENT





- MENTAL STATUS


Mental Status: Alert, Oriented





- VITAL SIGNS


Vital Signs: 


                                Last Vital Signs











Temp  97.1 F   08/24/21 18:00


 


Pulse  113 H  08/24/21 15:47


 


Resp  18   08/24/21 15:47


 


BP  123/82   08/24/21 15:47


 


Pulse Ox  97   08/24/21 15:47














- RESPIRATORY


Respiratory Status: Respiratory Rate WNL, Airway Patent, O2 Saturation Stable





- CARDIOVASCULAR


CV Status: Pulse Rate WNL, Blood Pressure Stable





- GASTROINTESTINAL


GI Status: No Symptoms





- POST OP HYDRATION


Hydration Status: Adequate & Stable

## 2021-08-27 NOTE — [id number]
Post Anesthesia Note





- EVALUATION WITHIN 48HRS OF ANESTHETIC


Vital Signs in Normal Range: Yes


Patient Participated in Evaluation: Yes


Respiratory Function Stable: Yes


Airway Patent: Yes


Cardiovascular Function Stable: Yes


Hydration Status Stable: Yes


Pain Control Satisfactory: Yes


Nausea and Vomiting Control Satisfactory: Yes


Mental Status Recovered: Yes


Vital Signs: 


                                Last Vital Signs











Temp  97.1 F   08/24/21 18:00


 


Pulse  113 H  08/24/21 15:47


 


Resp  18   08/24/21 15:47


 


BP  123/82   08/24/21 15:47


 


Pulse Ox  97   08/24/21 15:47

## 2021-09-13 ENCOUNTER — HOSPITAL ENCOUNTER (EMERGENCY)
Dept: HOSPITAL 56 - MW.ED | Age: 26
Discharge: HOME | End: 2021-09-13
Payer: COMMERCIAL

## 2021-09-13 DIAGNOSIS — Z88.8: ICD-10-CM

## 2021-09-13 DIAGNOSIS — T81.30XA: Primary | ICD-10-CM

## 2021-09-13 NOTE — EDM.PDOC
ED HPI GENERAL MEDICAL PROBLEM





- General


Chief Complaint: Wound Recheck


Stated Complaint: RECENT ; NEEDS PACKING


Time Seen by Provider: 21 02:54





- History of Present Illness


INITIAL COMMENTS - FREE TEXT/NARRATIVE: 





History of present illness:


[] The patient has a wound dehiscence after  section.  Tonight the drain

 stopped working and she pulled out.  She said she was told she had only 2 hours

 to get the packing replaced after that so that it did not reaccumulate 

infection.  She feels nauseated because of the discomfort in her abdomen when I 

manipulate the packing.





Review of systems: 


As per history of present illness and below otherwise all systems reviewed and 

negative.





Past medical history: 


As per history of present illness and as reviewed below otherwise 

noncontributory.





Surgical history: 


As per history of present illness and as reviewed below otherwise 

noncontributory.





Social history: 


No reported history of drug or alcohol abuse.





Family history: 


As per history of present illness and as reviewed below otherwise 

noncontributory.





Physical exam:


Constitutional - well developed, well-nourished and in no acute distress


HEENT - normocephalic, no evidence of trauma - external nose and mouth normal - 

no mass in neck and no JVD - mucosae moist





EYES - full EOM, PERRL, no icterus - no evidence of inflammation, injection, or 

drainage





Respiratory - no respiratory distress, equal bilateral expansio


GI -low anterior wound dehiscence.  There is a 5 x 3 cm crater in the center of 

her lower abdomen which does not violate the fascia.  Foam padding removed and 

the patient has proud flesh or granulation tissue.  There is no necrosis or 

purulence.  There is no fluctuant tissue beneath it.  Saline wet-to-dry dressing

 applied.  Abdomen soft without distension or organomegaly - normal bowel sounds

 - no guard or rebound





Musculoskeletal  no gross deformity of long bones or joints - no tenderness, 

swelling or edema





Neurologic - Alert and oriented times four - CN II-XII grossly intact - motor 

sensory and coordination symmetrically normal





Psychiatric - appropriate mood and affect with normal thought content





Hematologic - No petechiae or purpura - mucosa appropriate color and sclera not 

pale - normal nail bed color and refill





Integument - no rash or evidence of trauma - normal turgor





Diagnostics:


[]





Therapeutics:


[]





Impression: 


[]





Plan:


[]





Definitive disposition and diagnosis as appropriate pending reevaluation and 

review of above.











- Related Data


                                    Allergies











Allergy/AdvReac Type Severity Reaction Status Date / Time


 


infliximab [From Remicade] Allergy Unknown Hives Verified 21 21:21











Home Meds: 


                                    Home Meds





Cholecalciferol (Vitamin D3) [Vitamin D3] 1,000 unit PO 21 [History]


Ferrous Sulfate, Dried [Iron] 160 mg PO DAILY 21 [History]


Pnv No.95/Ferrous Fum/Folic AC [Prenatal Caplet] 1 each PO DAILY 21 

[History]


Acetaminophen/oxyCODONE [Percocet 325-5 MG] 1 - 2 tab PO Q4H PRN 5 Days #20 

tablet 21 [Rx]


Ibuprofen [Motrin] 800 mg PO Q8H PRN 5 Days #30 tablet 21 [Rx]


Acetaminophen/oxyCODONE [Percocet 325-5 MG] 1 each PO Q6H PRN #20 tab 21 

[Rx]


Ibuprofen 600 mg PO Q6H PRN #40 tablet 21 [Rx]











Past Medical History


OB/GYN History: Reports: Pregnancy


Hematologic History: Reports: Anemia, Iron Deficiency


Dermatologic History: Reports: Psoriasis





- Infectious Disease History


Infectious Disease History: Reports: None





- Past Surgical History


HEENT Surgical History: Reports: Adenoidectomy, Tonsillectomy


GI Surgical History: Reports: Appendectomy, Cholecystectomy, Other (See Below)


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 

intestinal rupture with gangrene





Social & Family History





- Family History


Family Medical History: No Pertinent Family History





- Tobacco Use


Tobacco Use Status *Q: Never Tobacco User





- Caffeine Use


Caffeine Use: Reports: Tea





- Recreational Drug Use


Recreational Drug Use: No





ED ROS GENERAL





- Review of Systems


Review Of Systems: Comprehensive ROS is negative, except as noted in HPI.





ED EXAM, GENERAL





- Physical Exam


Exam: See Below


Free Text/Narrative:: 





My physical exam is in the HPI





Course





- Vital Signs


Last Recorded V/S: 





                                Last Vital Signs











Temp  36.7 C   21 02:52


 


Pulse  98   21 02:52


 


Resp  17   21 02:52


 


BP  130/80   21 02:52


 


Pulse Ox  98   21 02:52














Departure





- Departure


Time of Disposition: 03:17


Disposition: Home, Self-Care 01


Condition: Good


Clinical Impression: 


 Wound dehiscence








- Discharge Information


Instructions:  Wound Dehiscence, Easy-to-Read


Referrals: 


PCP,None [Primary Care Provider] - 


Additional Instructions: 


Call the person that placed your packing and drain today.  He will then we did a

removal of the thumb and did a sterile saline wet-to-dry dressing.  Let them 

decide when you should have it changed.  Return if you have a high fever or get 

sick.





[facility]


[address] [place] [address]


Phone: [phone]


Fax: [phone]








[facility]


[address] [place] [address]


Phone: [phone]


Fax: [phone]





The following information is given to patients seen in the emergency department 

who are being discharged to home. This information is to outline your options 

for follow-up care. We provide all patients seen in our emergency department 

with a follow-up referral.





The need for follow-up, as well as the timing and circumstances, are variable 

depending upon the specifics of your emergency department visit.





If you don't have a primary care physician on staff, we will provide you with a 

referral. We always advise you to contact your personal physician following an 

emergency department visit to inform them of the circumstance of the visit and 

for follow-up with them and/or the need for any referrals to a consulting 

specialist.





The emergency department will also refer you to a specialist when appropriate. 

This referral assures that you have the opportunity for follow-up care with a 

specialist. All of these measure are taken in an effort to provide you with 

optimal care, which includes your follow-up.





Under all circumstances we always encourage you to contact your private 

physician who remains a resource for coordinating your care. When calling for 

follow-up care, please make the office aware that this follow-up is from your 

recent emergency room visit. If for any reason you are refused follow-up, please

contact the [facility] Emergency Department

at [phone] and asked to speak to the emergency department charge nurse.








Sepsis Event Note (ED)





- Focused Exam


Vital Signs: 





                                   Vital Signs











  Temp Pulse Resp BP Pulse Ox


 


 21 02:52  36.7 C  98  17  130/80  98

## 2021-11-21 ENCOUNTER — HOSPITAL ENCOUNTER (EMERGENCY)
Dept: HOSPITAL 56 - MW.ED | Age: 26
Discharge: HOME | End: 2021-11-21
Payer: MEDICAID

## 2021-11-21 DIAGNOSIS — Z88.8: ICD-10-CM

## 2021-11-21 DIAGNOSIS — T81.49XA: Primary | ICD-10-CM

## 2021-11-21 DIAGNOSIS — Z79.899: ICD-10-CM

## 2021-11-21 DIAGNOSIS — D50.9: ICD-10-CM

## 2021-11-21 PROCEDURE — 99283 EMERGENCY DEPT VISIT LOW MDM: CPT

## 2021-11-21 NOTE — EDM.PDOC
ED HPI GENERAL MEDICAL PROBLEM





- General


Chief Complaint: Skin Complaint


Stated Complaint: POSSIBLE INFECTION  INCISION


Time Seen by Provider: 21 22:25





- History of Present Illness


INITIAL COMMENTS - FREE TEXT/NARRATIVE: 





HISTORY AND PHYSICAL:





History of present illness:


This a 25-year-old female who presents ER today for evaluation of a  

incision.  Patient reports that she had  approximately 3 months ago as 

a complicated with infections that have required a wound VAC.  Patient reports 

that she just finished a course of antibiotics 10 days ago with Bactrim.  

Patient presents ER today secondary to increased redness and drainage from her 

wound.  Patient ports that she is seen frequently by a wound specialist nurse.  

Patient reports that she is scheduled to be seen tomorrow by her nurse.  Patient

reports that she called her OB doctor today and they recommend that she come to 

the ER for evaluation for possible antibiotics.  Patient denies any recent 

fevers, shakes, chills, nausea, vomiting, diarrhea, dysuria, frequency, urgency.

 Patient reports increased redness and discomfort and burning sensation at the 

incision/infection site.





Review of systems: 


As per history of present illness and below otherwise all systems reviewed and 

negative.





Past medical history: 


As per history of present illness and as reviewed below otherwise 

noncontributory.





Surgical history: 


As per history of present illness and as reviewed below otherwise 

noncontributory.





Social history: 


No reported history of drug abuse.





Family history: 


As per history of present illness and as reviewed below otherwise 

noncontributory.





Physical exam:





This patient was seen and evaluated during the  SARS-CoV-2 novel coronavirus

pandemic period.  Community viral transmission is ongoing at time of this encoun

ter and the emergency department is operating under pandemic response 

procedures.





Constitutional: Patient is oriented to person, place, and time.  Appears well-

developed and well-nourished.  No distress.


 HEENT: Moist mucous membranes


 Head: Normocephalic and atraumatic


 Eyes: Right eye exhibits no discharge.  Left eye exhibits no discharge.  No 

scleral icterus


 Neck: Normal range of motion.  No tracheal deviation present.


 Cardiovascular: Normal rate and regular rhythm.


 Pulmonary: Effort normal, no respiratory distress.


 Abdominal: No distention


 Musculoskeletal: Normal range of motion


 Neurologic: Alert and oriented to person, place and time.


 Skin: Pink, warm and dry.  


 Psychiatric: Normal mood and affect.  Behavior is normal.  Judgment and thought

content normal.


 Nursing note and vital signs have been reviewed





Patient's ER physical exam is significant for a healing vertical wound in the 

infra umbilical region.  Patient does have surrounding erythema with satellite 

lesions consistent with possible fungal infection.  Patient also has a redness, 

erythema small amount drainage in the center of the healing wound.





Diagnostics:


[]





Therapeutics:


[]





Assessment and plan:


25-year female with likely wound infection from her .  I have discussed

 with patient that since this is the first time I am seeing it is unclear to me 

whether or not it is improving or not and the patient reports that she has not 

been keeping close tabs on and she is unsure whether or not it looks better or 

worse.  Given the uncertainty I have recommended that we start her on Bactrim DS

 2 pills twice a day for 10 days as well as Lotrimin cream for possible fungal 

infection until she is further evaluated by her wound specialist and she can 

make an ultimate decision tomorrow.  Patient is agreeable with this current 

plan.





Reassessment at the time of disposition demonstrates that the patient is in no 

acute distress.  The patient has remained stable throughout the entire ED visit 

and is without objective evidence for acute process requiring urgent 

intervention or hospitalization. The patient is stable for discharge, counseling

 is provided as documented above, discussed symptomatic treatment and specific 

conditions for return.





I have spoken with the patient/caregiver and discussed todays findings, in 

addition to providing specific details for the plan of care. Questions are 

answered and there is agreement with the plan.





Definitive disposition and diagnosis as appropriate pending reevaluation and 

review of above.








  ** abominal area


Pain Score (Numeric/FACES): 6





- Related Data


                                    Allergies











Allergy/AdvReac Type Severity Reaction Status Date / Time


 


infliximab [From Remicade] Allergy Unknown Hives Verified 21 22:22











Home Meds: 


                                    Home Meds





Cholecalciferol (Vitamin D3) [Vitamin D3] 1,000 unit PO 21 [History]


Ferrous Sulfate, Dried [Iron] 160 mg PO DAILY 21 [History]


Pnv No.95/Ferrous Fum/Folic AC [Prenatal Caplet] 1 each PO DAILY 21 

[History]


Acetaminophen/oxyCODONE [Percocet 325-5 MG] 1 - 2 tab PO Q4H PRN 5 Days #20 

tablet 21 [Rx]


Ibuprofen [Motrin] 800 mg PO Q8H PRN 5 Days #30 tablet 21 [Rx]


Acetaminophen/oxyCODONE [Percocet 325-5 MG] 1 each PO Q6H PRN #20 tab 21 

[Rx]


Ibuprofen 600 mg PO Q6H PRN #40 tablet 21 [Rx]


Clotrimazole [Clotrimazole 1%] 1 applic TOP BID #30 ml 21 [Rx]


Sulfamethoxazole/Trimethoprim [Bactrim Ds Tablet] 2 each PO BID #40 tablet 

21 [Rx]











Past Medical History


HEENT History: Reports: None


Cardiovascular History: Reports: None


Respiratory History: Reports: None


Gastrointestinal History: Reports: Other (See Below)


Other Gastrointestinal History: chron's disease


Genitourinary History: Reports: None


OB/GYN History: Reports: Pregnancy


Musculoskeletal History: Reports: None


Neurological History: Reports: None


Psychiatric History: Reports: None


Endocrine/Metabolic History: Reports: None


Insulin Pump Model and : N/A


Hematologic History: Reports: Anemia, Iron Deficiency


Immunologic History: Reports: None


Oncologic (Cancer) History: Reports: None


Dermatologic History: Reports: Psoriasis





- Infectious Disease History


Infectious Disease History: Reports: None





- Past Surgical History


Head Surgeries/Procedures: Reports: None


HEENT Surgical History: Reports: Adenoidectomy, Tonsillectomy


GI Surgical History: Reports: Appendectomy, Cholecystectomy, Other (See Below)


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 

intestinal rupture with gangrene





Social & Family History





- Family History


Family Medical History: No Pertinent Family History





- Caffeine Use


Caffeine Use: Reports: None





- Recreational Drug Use


Recreational Drug Use: No





ED ROS GENERAL





- Review of Systems


Review Of Systems: See Below





ED EXAM, SKIN/RASH


Exam: See Below





Course





- Vital Signs


Last Recorded V/S: 





                                Last Vital Signs











Temp  97.2 F   21 22:22


 


Pulse  98   21 22:22


 


Resp  18   21 22:22


 


BP  127/64   21 22:22


 


Pulse Ox  97   21 22:22














- Orders/Labs/Meds


Meds: 





Medications














Discontinued Medications














Generic Name Dose Route Start Last Admin





  Trade Name Freq  PRN Reason Stop Dose Admin


 


Clotrimazole  1 gm  21 22:35 





  Clotrimazole 1% Crm 30 Gm Tube  TOP  21 22:36 





  BID STA  


 


Trimethoprim/Sulfamethoxazole  2 tab  21 22:35 





  Sulfamethoxazole/Trimethoprim 800-160 Mg Tab  PO  21 22:36 





  ONETIME ONE  














Departure





- Departure


Time of Disposition: 22:46


Disposition: Home, Self-Care 01


Condition: Good


Clinical Impression: 


 Postoperative wound infection








- Discharge Information


Instructions:  Wound Infection, Easy-to-Read


Referrals: 


Easton Phillips MD [Primary Care Provider] - 


Additional Instructions: 


You were seen and evaluated in the ER today for possible postoperative wound 

infection of your  scar.  Given the redness and the discomfort that you 

are experiencing I will start you on an antibiotic, Bactrim DS, 2 tablets twice 

a day for 10 days.  You should also apply antifungal cream such as Lotrimin 

twice a day to the area.  Please have your wound specialist take a look at it t

omorrow as scheduled so she can decide if she would like any change in the 

current plan of care.





The following information is given to patients seen in the emergency department 

who are being discharged to home. This information is to outline your options 

for follow-up care. We provide all patients seen in our emergency department 

with a follow-up referral.





The need for follow-up, as well as the timing and circumstances, are variable 

depending upon the specifics of your emergency department visit.





If you don't have a primary care physician on staff, we will provide you with a 

referral. We always advise you to contact your personal physician following an 

emergency department visit to inform them of the circumstance of the visit and 

for follow-up with them and/or the need for any referrals to a consulting 

specialist.





The emergency department will also refer you to a specialist when appropriate. 

This referral assures that you have the opportunity for follow-up care with a 

specialist. All of these measure are taken in an effort to provide you with 

optimal care, which includes your follow-up.





Under all circumstances we always encourage you to contact your private 

physician who remains a resource for coordinating your care. When calling for 

follow-up care, please make the office aware that this follow-up is from your 

recent emergency room visit. If for any reason you are refused follow-up, please

contact the [facility] Emergency Department

at [phone] and asked to speak to the emergency department charge nurse.





[facility]


[address] [address]


Phone: [phone]


Fax: [phone]








[facility]


[address] [place] [address]


Phone: [phone]


Fax: [phone]








Sepsis Event Note (ED)





- Evaluation


Sepsis Screening Result: No Definite Risk





- Focused Exam


Vital Signs: 





                                   Vital Signs











  Temp Pulse Resp BP Pulse Ox


 


 21 22:22  97.2 F  98  18  127/64  97

## 2021-12-23 ENCOUNTER — HOSPITAL ENCOUNTER (OUTPATIENT)
Dept: HOSPITAL 56 - MW.SDS | Age: 26
Discharge: HOME | End: 2021-12-23
Attending: SURGERY
Payer: MEDICAID

## 2021-12-23 DIAGNOSIS — Z88.2: ICD-10-CM

## 2021-12-23 DIAGNOSIS — Z90.49: ICD-10-CM

## 2021-12-23 DIAGNOSIS — Z88.8: ICD-10-CM

## 2021-12-23 DIAGNOSIS — T81.89XA: Primary | ICD-10-CM

## 2021-12-23 DIAGNOSIS — K50.90: ICD-10-CM

## 2021-12-23 DIAGNOSIS — Z98.890: ICD-10-CM

## 2021-12-23 PROCEDURE — 81025 URINE PREGNANCY TEST: CPT

## 2021-12-23 PROCEDURE — 11042 DBRDMT SUBQ TIS 1ST 20SQCM/<: CPT

## 2021-12-23 NOTE — OR
SURGEON:

VICKY MADISON MD

 

DATE OF PROCEDURE:  2021

 

PREOPERATIVE DIAGNOSIS:

Chronic lower abdominal wound.

 

POSTOPERATIVE DIAGNOSIS:

Chronic lower abdominal wound.

 

PROCEDURE PERFORMED:

Excisional wound debridement, abdominal wound.

 

PRIMARY SURGEON:

Vicky Madison MD

 

ANESTHESIA:

General.

 

FLUIDS:

1000 mL crystalloid.

 

ESTIMATED BLOOD LOSS:

5 mL.

 

FINDINGS:

Original wound measured 6 x 3 x 0.5 cm in size.  Postoperative wound measured 8

x 3.5 x 1.5 cm in size.  Suture debris in the base of the wounds.

 

COMPLICATIONS:

None.

 

INDICATIONS:

The patient is a 26-year-old female who presented to my clinic after three to

four months of a nonhealing classical  scar.  Her  was

complicated by intraabdominal adhesions as well as a postoperative wound

dehiscence.  She has had chronic draining areas along the lower part of the

incision.  A CT scan was done that showed chronic inflammation between the

bladder and the abdominal wall.  When I inspected the wound, I could see suture

at the base of the draining areas.  The decision was made to debride the wound

in the operating room, remove any suture material, and excise any chronically

inflamed tissue to provide clean wound edges.  The patient and I discussed the

procedure, expected perioperative course, and the risks.  She verbalized

understanding and wishes to proceed.

 

PROCEDURE IN DETAIL:

The patient was brought in to the OR and placed on the OR table in supine

position.  A time-out was completed verifying the patient's name, age, date of

birth, allergies, and procedure to be performed.  General anesthesia was

induced.  The abdomen was prepped and draped in usual standard fashion.  I

measured the wound.  It measured 6 x 3 x 0.5 cm in size.  An elliptical incision

was made using a 15-blade around this wound.  I then used cautery to dissect off

the subcutaneous tissue.  Underneath the subcutaneous tissue were areas of

thickened scar as well as areas of necrotic chronically inflamed tissue.  I

debrided this back both sharply and with blunt dissection.  I explored several

of the draining areas with a hemostat.  These did not seem to transverse through

the fascia, but were right up to the fascia.  There was suture debris at the

base of these.  This was removed using sharp dissection.  The wound was

irrigated copiously with normal saline and hemostasis achieved with

electrocautery.  At the end of my debridement, the wound measured 8 x 3.5 x 1.5

cm in size.  Wet-to-dry dressings were applied and secured to the skin using

tape.  The patient tolerated the procedure well and was transferred to the PACU

in stable condition.  All counts were complete and correct at the end of the

case.

 

 

JULIANA / PATRICK

DD:  2021 14:23:22

DT:  2021 15:58:55

Job #:  [id number]

## 2021-12-23 NOTE — PCM.OPNOTE
- General Post-Op/Procedure Note


Date of Surgery/Procedure: 12/23/21


Operative Procedure(s): Excisional wound debridement


Findings: 





Wound 6 x 3 x 0.5 cm to start. Once tissue excised and debrided wound, wound 

measured 8 x 3.5 x 1.5 cm. Suture debris at base of wound


Pre Op Diagnosis: Chronic abdominal wound


Post-Op Diagnosis: same


Anesthesia Technique: Local, MAC


Primary Surgeon: Vicky Madison


Condition: Good

## 2021-12-23 NOTE — PCM.POSTAN
POST ANESTHESIA ASSESSMENT





- MENTAL STATUS


Mental Status: Alert, Oriented





- VITAL SIGNS


Vital Signs: 


                                Last Vital Signs











Temp  97.0 F   12/23/21 11:36


 


Pulse  75   12/23/21 11:52


 


Resp  14   12/23/21 11:52


 


BP  91/40 L  12/23/21 11:52


 


Pulse Ox  99   12/23/21 11:52














- RESPIRATORY


Respiratory Status: Respiratory Rate WNL, Airway Patent, O2 Saturation Stable





- CARDIOVASCULAR


CV Status: Pulse Rate WNL, Blood Pressure Stable





- GASTROINTESTINAL


GI Status: No Symptoms





- POST OP HYDRATION


Hydration Status: Adequate & Stable

## 2021-12-23 NOTE — [email or web address]
Preanesthetic Assessment





- Anesthesia/Transfusion/Family Hx


Anesthesia History: Prior Anesthesia Without Reaction


Other Type of Anesthesia Reaction Comment: "some nausea"


Family History of Anesthesia Reaction: No


Transfusion History: No Prior Transfusion(s)





- Review of Systems


General: No Symptoms


Pulmonary: No Symptoms


Cardiovascular: No Symptoms


Gastrointestinal: No Symptoms


Neurological: No Symptoms


Other: Reports: None





- Physical Assessment


NPO Status Date: 21


NPO Status Time: 00:00


Vital Signs: 





                                Last Vital Signs











Temp  36.6 C   21 07:55


 


Pulse  112 H  21 07:55


 


Resp  16   21 07:55


 


BP  122/77   21 07:55


 


Pulse Ox  97   21 07:55











Height: 1.7 m


Weight: 81.193 kg


ASA Class: 3


Mental Status: Alert & Oriented x3


Airway Class: Mallampati = 4


Dentition: Reports: Normal Dentition


Thyro-Mental Finger Breadths: 4


Mouth Opening Finger Breadths: 2


ROM/Head Extension: Full


Lungs: Clear to Auscultation, Normal Respiratory Effort


Cardiovascular: Regular Rate, Regular Rhythm





- Lab


Values: 





                             Laboratory Last Values











Urine HCG, Qual  NEGATIVE  (NEGATIVE)   21  07:55    














- Allergies


Allergies/Adverse Reactions: 


                                    Allergies











Allergy/AdvReac Type Severity Reaction Status Date / Time


 


infliximab [From Remicade] Allergy Unknown Hives Verified 21 09:29


 


sulfamethoxazole Allergy  Rash Verified 21 10:52





[From Bactrim]     


 


trimethoprim [From Bactrim] Allergy  Rash Verified 21 10:52














- Blood


Blood Available: No


Product(s) Available: None





- Anesthesia Plan


Pre-Op Medication Ordered: None





- Acknowledgements


Anesthesia Type Planned: General Anesthesia, Regional Block


Pt an Appropriate Candidate for the Planned Anesthesia: Yes


Alternatives and Risks of Anesthesia Discussed w Pt/Guardian: Yes


Pt/Guardian Understands and Agrees with Anesthesia Plan: Yes





PreAnesthesia Questionnaire


HEENT History: Reports: None


Cardiovascular History: Reports: None


Respiratory History: Reports: None


Gastrointestinal History: Reports: Other (See Below)


Other Gastrointestinal History: chron's disease


Genitourinary History: Reports: None


OB/GYN History: Reports: Pregnancy


Musculoskeletal History: Reports: None


Neurological History: Reports: Concussion


Psychiatric History: Reports: None


Endocrine/Metabolic History: Reports: None


Hematologic History: Reports: Anemia, Iron Deficiency


Immunologic History: Reports: None


Oncologic (Cancer) History: Reports: None


Dermatologic History: Reports: Psoriasis


Other Dermatologic History: nonhealing lower abd wound





- Infectious Disease History


Infectious Disease History: Reports: None





- Past Surgical History


Head Surgeries/Procedures: Reports: None


HEENT Surgical History: Reports: Adenoidectomy, Tonsillectomy


Cardiovascular Surgical History: Reports: None


Respiratory Surgical History: Reports: Other (See Below)


Other Respiratory Surgeries/Procedures: hx of PICC line insertion


GI Surgical History: Reports: Appendectomy, Cholecystectomy, Other (See Below)


Other GI Surgeries/Procedures: age 11 bowel resection with ileostomy for 

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


Female  Surgical History: Reports:  Section


Other Female  Surgeries/Procedures: c/section on 20


Endocrine Surgical History: Reports: None


Neurological Surgical History: Reports: None


Musculoskeletal Surgical History: Reports: None


Oncologic Surgical History: Reports: None


Dermatological Surgical History: Reports: None





- SUBSTANCE USE


Tobacco Use Status *Q: Never Tobacco User





- HOME MEDS


Home Medications: 


                                    Home Meds





Acetaminophen [Tylenol Extra Strength] 2 tab PO ASDIRECTED PRN 21 

[History]











- CURRENT (IN HOUSE) MEDS


Current Meds: 





                               Current Medications





Albuterol (Albuterol 0.083% 2.5 Mg/3 Ml Neb Soln)  2.5 mg NEB ONETIME PRN


   PRN Reason: Wheezing


Droperidol (Droperidol 5 Mg/2 Ml Sdv)  0.625 mg IVPUSH ONETIME PRN


   PRN Reason: Nausea/Vomiting


Fentanyl (Fentanyl 100 Mcg/2 Ml Sdv)  50 mcg IVPUSH Q5M PRN


   PRN Reason: Pain (mild 1-3)


Hydromorphone HCl (Hydromorphone 1 Mg/Ml Syringe)  1 mg IVPUSH Q10M PRN


   PRN Reason: Pain (moderate 4-6)


Lactated Ringer's (Ringers, Lactated)  1,000 mls @ 125 mls/hr IV ASDIRECTED ILEANA


   Last Admin: 21 08:29 Dose:  125 mls/hr


   Documented by: 


Metoclopramide HCl (Metoclopramide 10 Mg/2 Ml Sdv)  10 mg IVPUSH ONETIME PRN


   PRN Reason: Nausea/Vomiting


Morphine Sulfate (Morphine 4 Mg/Ml Vial)  2 mg IVPUSH Q10M PRN


   PRN Reason: Pain (severe 7-10)


Naloxone HCl (Naloxone 0.4 Mg/Ml Sdv)  0.1 mg IVPUSH ASDIRECTED PRN


   PRN Reason: Respiratory Depression


Ondansetron HCl (Ondansetron 4 Mg/2 Ml Sdv)  4 mg IVPUSH ONETIME PRN


   PRN Reason: Nausea/Vomiting


Scopolamine (Scopolamine 1.5 Mg Transdermal Patch)  1.5 mg TRDERM Q72H PRN


   PRN Reason: Nausea/Vomiting


   Last Admin: 21 08:30 Dose:  1.5 mg


   Documented by: 


Sodium Chloride (Sodium Chloride 0.9% 2.5 Ml Syringe)  2.5 ml FLUSH ASDIRECTED 

PRN


   PRN Reason: Keep Vein Open


Sodium Chloride (Sodium Chloride 0.9% 20 Ml Sdv)  10 ml IV ASDIRECTED PRN


   PRN Reason: IV Use


Sodium Chloride (Sodium Chloride 0.9% 10 Ml Syringe)  10 ml FLUSH ASDIRECTED PRN


   PRN Reason: Keep Vein Open





Discontinued Medications





Cefazolin Sodium/Dextrose 2 gm (/ Premix)  50 mls @ 100 mls/hr IV ONETIME ONE


   Stop: 21 10:33

## 2021-12-23 NOTE — [id number]
Post Anesthesia Note





- EVALUATION WITHIN 48HRS OF ANESTHETIC


Vital Signs in Normal Range: Yes


Patient Participated in Evaluation: Yes


Respiratory Function Stable: Yes


Airway Patent: Yes


Cardiovascular Function Stable: Yes


Hydration Status Stable: Yes


Pain Control Satisfactory: Yes


Nausea and Vomiting Control Satisfactory: Yes


Mental Status Recovered: Yes


Vital Signs: 


                                Last Vital Signs











Temp  97.0 F   12/23/21 11:36


 


Pulse  75   12/23/21 11:52


 


Resp  14   12/23/21 11:52


 


BP  91/40 L  12/23/21 11:52


 


Pulse Ox  99   12/23/21 11:52